# Patient Record
Sex: MALE | Race: WHITE | Employment: FULL TIME | URBAN - METROPOLITAN AREA
[De-identification: names, ages, dates, MRNs, and addresses within clinical notes are randomized per-mention and may not be internally consistent; named-entity substitution may affect disease eponyms.]

---

## 2018-05-17 ENCOUNTER — HOSPITAL ENCOUNTER (OUTPATIENT)
Dept: RADIATION ONCOLOGY | Age: 73
Discharge: HOME | End: 2018-05-17
Payer: COMMERCIAL

## 2018-05-17 VITALS
DIASTOLIC BLOOD PRESSURE: 81 MMHG | BODY MASS INDEX: 24.8 KG/M2 | TEMPERATURE: 97.6 F | SYSTOLIC BLOOD PRESSURE: 130 MMHG | OXYGEN SATURATION: 96 % | HEART RATE: 68 BPM | WEIGHT: 163.1 LBS | RESPIRATION RATE: 18 BRPM

## 2018-05-17 DIAGNOSIS — C61 PROSTATE CANCER (HCC): Primary | ICD-10-CM

## 2018-05-17 PROCEDURE — 99211 OFF/OP EST MAY X REQ PHY/QHP: CPT

## 2018-05-17 NOTE — PROGRESS NOTES
Pt here today for initial RT consult for prostate cancer with Dr. Teresita Wright. Pt was first diagnosed in 2005. His 12/4/17 PSA 6.3. Pt has an AUA score of 2/35 with minimal urinary c/o. Pt completed an Yris scan at [facility]--postivie prostate lesion and also right iliac node. An overview of RT was given. Pt would like to delay getting started with RT as his wife has Lyme disease and she is needing treatments in [place] and he is planning to travel frequently. Pt will have an MRI Pelvis (order in Epic) and be presented at both [place] and [facility]'s, and then return in 3 weeks for further treatment discussion. Dr. Teresita Wright spoke with pt about the possibility of him receiving Cyberknife at [facility].

## 2018-05-17 NOTE — CONSULTS
Patient: Coretta Aguila MRN: [id number]  SSN: [id number]    YOB: 1945  Age: 68 y.o. Sex: male      Other Providers:  Ashley Saeed MD, Lorena Ziegler MD, Ron Kurtz MD    CHIEF COMPLAINT: Prostate Cancer    DIAGNOSIS: Low risk prostate adenocarcinoma, GS 3+3=6 in 3 JANET Cores,  PSA 6.3 (Density 0.27). Unequivocal findings of multiple pelvic lymph nodes on MRI. PREVIOUS TREATMENT:  1) TRUS Biopsy 2005 and 2010. 2) JANET BIOPSY 2/27/18     HISTORY OF PRESENT ILLNESS:  Coretta Aguila is a 68 y.o. male who I am seeing at the request of Dr. Victorina Figueroa for prostate cancer. He was seen and evaluated for an elevated PSA on routine screening with original biopsy in 2005. He went to [facility] and choose to undergo a trial of observation. However, per the referring provider's notes, it appears Dr. Leland Akers actually recommended radical prostatectomy at the time.     The patient had a negative biopsy by Dr. Celsa Duong in follow-up in 2010. Shanae Topete had the MRI 3/6/17 which showed a 4 x 8 mm posterior left apical lesion that was PIRADS and additionally a 4 mm left mid lesion that was PIRADS 3. Dr. Victorina Figueroa recommended that he be sent over to regional urology for strong consideration of biopsy.  Additionally he had some borderline enlarged lymph nodes in the right pelvis. He did undergo fusion biopsy which revealed 3 different areas containing Mark 6 disease. In addition he had 16 x 7 mm right obturator node and an 11 x 8 mm right iliac noted. No bony metastases were apparent. He continues to do well from a voiding standpoint. He enjoys excellent health for his age and has the appearance of a much younger man. PSA History:    1/2015:  3.8  7/2015:  3.9  7/2016:  3.99  2/2017:  4.9  11/2017:  6.3    Biopsy Results:    2005:  Mark's 3+4=7 left apex   2010:  Repeat biopsy was negative. JANET BIOPSY:  3 cores of GS 3+3=6. 23 gm. There was PNI.       He was ultimately seen back in the urology office to discuss the results of the biopsy and management options. Both radiation and surgical options were discussed and he has been referred to me to further discuss radiation as an option for management of his prostate cancer. GENITOURINARY REVIEW OF SYSTEMS:  An EPIC 26 was completed pretreatment. His AUA score was 2. He is sexually potent. PAST MEDICAL HISTORY:    Past Medical History:   Diagnosis Date    Cancer [facility])     prostate    Elevated PSA     Malignant neoplasm of prostate ([address]     Personal history of prostate cancer        The patient denies history of collagen vascular diseases, pacemaker insertion, prior radiation or prior chemotherapy. PAST SURGICAL HISTORY:   Past Surgical History:   Procedure Laterality Date    HX CARPAL TUNNEL RELEASE      HX COLONOSCOPY      HX ORTHOPAEDIC      left bicep in arm       MEDICATIONS:   No current outpatient prescriptions on file. ALLERGIES:   No Known Allergies    SOCIAL HISTORY:   Social History     Social History    Marital status:      Spouse name: N/A    Number of children: N/A    Years of education: N/A     Occupational History    Not on file. Social History Main Topics    Smoking status: Never Smoker    Smokeless tobacco: Never Used    Alcohol use 5.0 oz/week     10 Cans of beer per week    Drug use: No    Sexual activity: Yes     Partners: Female     Other Topics Concern    Not on file     Social History Narrative       FAMILY HISTORY:   Family History   Problem Relation Age of Onset    Hypertension Other      gen fam hx       REVIEW OF SYSTEMS: Please see the completed review of systems sheet in the chart that I have reviewed today.       PHYSICAL EXAMINATION:   ECOG Performance status 0  VITAL SIGNS:   Visit Vitals    /81    Pulse 68    Temp 97.6 °F (36.4 °C)    Resp 18    Wt 74 kg (163 lb 1.6 oz)    SpO2 96%    BMI 24.8 kg/m2        GENERAL: The patient is well-developed, ambulatory, alert and in no acute distress. HEENT: Head is normocephalic, atraumatic. Pupils are equal, round and reactive to light and accommodation. Extraocular movement intact. Hearing is intact bilaterally to finger rub. Oral cavity reveals no lesions. Mucous membranes are moist. NECK: Neck is supple with no masses. CARDIOVASCULAR: Heart is regular rate and rhythm. There are no murmurs rubs or gallups. Radial pulses are 2+ RESPIRATORY: Lungs are clear to auscultation and percussion. There is normal respiratory effort. GASTROINTESTINAL: The abdomen is soft, non-tender, nondistended with no hepatospelnomagaly. Digital rectal examination: No evidence of internal or external hemorrhoids, clear rectal vault without palpable masses, smooth prostate gland with no nodularity. LYMPHATIC: There is no cervical, supraclavicular or axillary lymphadenopathy bilaterally. MUSCULOSKELETAL: Extremities reveal no cyanosis, clubbing or edema.  is 5+/5. NEURO:  Cranial nerves II-XII grossly intact. Muscular strength and sensation are intact throughout all four extremities. PATHOLOGY:    Please see HPI for details of TRUS Biopsy. LABORATORY: No results found for: NA, K, CL, CO2, AGAP, GLU, BUN, CREA, GFRAA, GFRNA, CA, MG, PHOS, ALB, TBIL, TP, ALB, GLOB, AGRAT, SGOT, ALT, GPT  No results found for: WBC, HGB, HCT, PLT, HGBEXT, HCTEXT, PLTEXT, HGBEXT, HCTEXT, PLTEXT    Please see HPI for PSA History. RADIOLOGY:    No results found. IMPRESSION:  Kennedy Garrett is a 68 y.o. male with technically low risk prostate cancer although some pelvic lymph nodes of unclear significance. The natural history of prostate cancer was reviewed with the patient. Prognostic features including stage, Mark score, age, race, and PSA were reviewed. Treatment options for prostate cancer including active surveillance, hormonal therapy, radiotherapy, and surgery were compared and contrasted with regard to outcome and quality of life.  I discussed the radiotherapy options including low-dose rate brachytherapy, high-dose-rate brachytherapy, and external beam radiation therapy. We also discussed the addition of hormone therapy. Side effects and complications of radiation therapy including fatigue, urinary obstructive symptoms, rectal irritation and bleeding, and decline of sexual function were among the complications highlighted. He asked many questions which were answered to his satisfaction. For low risk disease, there are several viable treatment options including active surveillance which includes delaying definitive therapy until which time the disease either progresses, or the patient is no longer interested in this option. At this time surgery with radical prostatectomy, LDR brachytherapy, or IMRT as monotherapy are recommended. I advised the patient each of these options offer similar tumor control with differences found mainly in varying toxicity profiles. I do not recommend hormone therapy low risk disease. For high risk disease, there are several viable treatment options but I generally do not advise active surveillance. Surgery with radical prostatectomy or IMRT examination with a protracted course of androgen deprivation therapy ( traditionally 2-3 years) are appropriate options. There is however data in support of use of 18 months of androgen deprivation therapy (compared to 36 months) based on a study published in abstract format at [facility] 2017 from a randomized phase III trial where cancer outcomes were similar but imrproved quality of life was noted in the shorter ADT group. I advised the patient each of these options offer similar tumor contol with differences found mainly in varying toxicity profiles.      IMRT is given as a single modality treatment or as initial treatment prior to an HDR boost.  A total of 45 Gy would be delivered to the pelvis and prostate gland followed by a 15 gray in 1 fraction HDR boost to the prostate gland alone. For patients who are not candidates for brachytherapy either by poor anatomy meaning substantial arch interference or a prostate gland outside of 20-60 cc where good dosimetry is often not achievable, IMRT is given for the entirety of the treatment course. Furthermore, for low or intermediate risk, there is now substantial data in support of hypo-fractionated radiation. Commonly a dose greater than 2 Gy per fraction, such as 2.5 Gy as given in numerous clinical trials including RTOG 0415, to a total dose of 70 Gy offers similar toxicity outcomes and is found to be non-inferior to standard fractionated radiation and therefore more logistically appealing. SBRT is given as single modality treatment for low risk prostate cancer. There is new and evolving data from the radiation oncology literature and support of what is complained as extreme hypo-fractionation. This generally includes treatment over 3-5 fractions to the prostate gland alone. Current studies have really evaluated only low risk disease with targeting of the prostate gland alone excluding the seminal vesicles and the lymph nodes. This is also generally offered for patients with low AUA scores. While its indications overlap substantially with LDR brachytherapy, it does not have the limitation of prostate gland size between 20 and 60 cc as even smaller glands are easily targetable with this technique. It also offers a substantial advantage of being given as an outpatient without any surgery required for any protracted hospital stay. While we are confident long-term data will be in support of this is a single modality treatment, its major limitation at this point is short follow-up on the studies that have been completed. Nonetheless, it is an excellent option for patients with low risk prostate cancer who are either not candidates for surgery or who wish to avoid surgery.      Unfortunately our dialogue was quite complex as his imaging findings from 2017 are not concordant with the biopsy findings with only having Mark 6 disease. For that reason I would like to repeat an MRI to better clarify the lymph nodes. These have changed, I would be concerned and potentially fill biopsy was warranted. If not, less aggressive options would be viable such as hyperfractionated or even extreme hyperfractionation with CyberKnife. After his imaging is completed I would like his case reviewed in conference performed again ultimately treatment decisions but do feel treatment is likely warranted based on the perineural invasion, MRI findings, and his PSA density. However, continued surveillance would not be unreasonable considering the long natural history of disease dating back to his original diagnosis in 2005. PLAN:    1) Discussed viable treatment options focusing on external beam radiation highlighting the risks, benefits, and side effects from treatment. 2) Reviewed available research treatment and cancer care protocols for which patient may be eligible. Unfortunately there are no matching clinical trials available at this time. 3) Further staging to include repeat MRI pelvis. 4) Patient will be scheduled for follow up after presentation in tumor board and subsequent staging scans before making a treatment decision.        Aron Cid MD   May 17, 2018

## 2018-06-18 ENCOUNTER — HOSPITAL ENCOUNTER (OUTPATIENT)
Dept: MRI IMAGING | Age: 73
Discharge: HOME | End: 2018-06-18
Attending: RADIOLOGY
Payer: MEDICARE

## 2018-06-18 DIAGNOSIS — C61 PROSTATE CANCER (HCC): ICD-10-CM

## 2018-06-18 LAB — CREAT BLD-MCNC: 0.7 MG/DL (ref 0.8–1.5)

## 2018-06-18 PROCEDURE — 74011000258 HC RX REV CODE- 258: Performed by: RADIOLOGY

## 2018-06-18 PROCEDURE — 82565 ASSAY OF CREATININE: CPT

## 2018-06-18 PROCEDURE — 72197 MRI PELVIS W/O & W/DYE: CPT

## 2018-06-18 PROCEDURE — A9575 INJ GADOTERATE MEGLUMI 0.1ML: HCPCS | Performed by: RADIOLOGY

## 2018-06-18 PROCEDURE — 74011250636 HC RX REV CODE- 250/636: Performed by: RADIOLOGY

## 2018-06-18 RX ORDER — SODIUM CHLORIDE 0.9 % (FLUSH) 0.9 %
10 SYRINGE (ML) INJECTION
Status: COMPLETED | OUTPATIENT
Start: 2018-06-18 | End: 2018-06-18

## 2018-06-18 RX ORDER — GADOTERATE MEGLUMINE 376.9 MG/ML
14 INJECTION INTRAVENOUS
Status: COMPLETED | OUTPATIENT
Start: 2018-06-18 | End: 2018-06-18

## 2018-06-18 RX ADMIN — Medication 10 ML: at 16:24

## 2018-06-18 RX ADMIN — SODIUM CHLORIDE 100 ML: 900 INJECTION, SOLUTION INTRAVENOUS at 16:24

## 2018-06-18 RX ADMIN — GADOTERATE MEGLUMINE 14 ML: 376.9 INJECTION INTRAVENOUS at 16:24

## 2018-06-28 ENCOUNTER — HOSPITAL ENCOUNTER (OUTPATIENT)
Dept: RADIATION ONCOLOGY | Age: 73
Discharge: HOME | End: 2018-06-28
Payer: MEDICARE

## 2018-06-28 VITALS
OXYGEN SATURATION: 100 % | SYSTOLIC BLOOD PRESSURE: 117 MMHG | BODY MASS INDEX: 24.37 KG/M2 | HEIGHT: 68 IN | RESPIRATION RATE: 16 BRPM | WEIGHT: 160.8 LBS | DIASTOLIC BLOOD PRESSURE: 76 MMHG | TEMPERATURE: 98.2 F | HEART RATE: 60 BPM

## 2018-06-28 PROCEDURE — 99211 OFF/OP EST MAY X REQ PHY/QHP: CPT

## 2018-06-28 NOTE — PROGRESS NOTES
F/u prostate cancer. S/p MDC. MRI pelvis 6-18-18. Pt will have Space Oar 8-22-18 per Felipa Patel. Cyberknife at [place]. Records emailed to [email or web address].     Frank Ramirez RN

## 2018-06-28 NOTE — PROGRESS NOTES
Patient: Franca Caro MRN: [id number]  SSN: [id number]    YOB: 1945  Age: 68 y.o. Sex: male      Other Providers:  Shauna Tucker MD, Michelle Farah MD, Margareth Reveles MD    CHIEF COMPLAINT: Prostate Cancer    DIAGNOSIS: Low risk prostate adenocarcinoma, GS 3+3=6 in 3 JANET Cores,  PSA 6.3 (Density 0.27). Unequivocal findings of multiple pelvic lymph nodes on MRI. PREVIOUS TREATMENT:  1) TRUS Biopsy 2005 and 2010. 2) JANET BIOPSY 2/27/18     HISTORY OF PRESENT ILLNESS:  Franca Caro is a 68 y.o. male who I am seeing at the request of Dr. Claudette Arbour for prostate cancer back in follow up after original consult 5/17/18. He was seen and evaluated for an elevated PSA on routine screening with original biopsy in 2005. He went to [facility] and choose to undergo a trial of observation. However, per the referring provider's notes, it appears Dr. Latrell Hager actually recommended radical prostatectomy at the time.     The patient had a negative biopsy by Dr. Henry Mart in follow-up in 2010. Bassam Alexis had the MRI 3/6/17 which showed a 4 x 8 mm posterior left apical lesion that was PIRADS and additionally a 4 mm left mid lesion that was PIRADS 3. Dr. Claudette Arbour recommended that he be sent over to regional urology for strong consideration of biopsy.  Additionally he had some borderline enlarged lymph nodes in the right pelvis. He did undergo fusion biopsy which revealed 3 different areas containing Mark 6 disease. In addition he had 16 x 7 mm right obturator node and an 11 x 8 mm right iliac noted. No bony metastases were apparent. He continues to do well from a voiding standpoint. He enjoys excellent health for his age and has the appearance of a much younger man. PSA History:    1/2015:  3.8  7/2015:  3.9  7/2016:  3.99  2/2017:  4.9  11/2017:  6.3    Biopsy Results:    2005:  Mark's 3+4=7 left apex   2010:  Repeat biopsy was negative. JANET BIOPSY:  3 cores of GS 3+3=6. 23 gm. There was PNI. He was ultimately seen back in the urology office to discuss the results of the biopsy and management options. Both radiation and surgical options were discussed and he has been referred to me to further discuss radiation as an option for management of his prostate cancer. Brittle lengthy discussion regarding his prior care in the accompanying factor of the involved lymph node. We felt it was likely benign, but we elected to proceed with an MRI to document any change in he returned to discuss further management options. GENITOURINARY REVIEW OF SYSTEMS:  An EPIC 26 was completed pretreatment. His AUA score was 2. He is sexually potent. PAST MEDICAL HISTORY:    Past Medical History:   Diagnosis Date    Cancer [facility])     prostate    Elevated PSA     Malignant neoplasm of prostate [address] [address]     Personal history of prostate cancer        The patient denies history of collagen vascular diseases, pacemaker insertion, prior radiation or prior chemotherapy. PAST SURGICAL HISTORY:   Past Surgical History:   Procedure Laterality Date    HX CARPAL TUNNEL RELEASE      HX COLONOSCOPY      HX ORTHOPAEDIC      left bicep in arm       MEDICATIONS:   No current outpatient prescriptions on file. ALLERGIES:   No Known Allergies    SOCIAL HISTORY:   Social History     Social History    Marital status:      Spouse name: N/A    Number of children: N/A    Years of education: N/A     Occupational History    Not on file.      Social History Main Topics    Smoking status: Never Smoker    Smokeless tobacco: Never Used    Alcohol use 5.0 oz/week     10 Cans of beer per week    Drug use: No    Sexual activity: Yes     Partners: Female     Other Topics Concern    Not on file     Social History Narrative       FAMILY HISTORY:   Family History   Problem Relation Age of Onset    Hypertension Other      gen fam hx       PHYSICAL EXAMINATION:   ECOG Performance status 0  VITAL SIGNS:   There were no vitals taken for this visit. GENERAL: The patient is well-developed, ambulatory, alert and in no acute distress. CARDIOVASCULAR: Heart is regular rate and rhythm. There are no murmurs rubs or gallups. Radial pulses are 2+ RESPIRATORY: Lungs are clear to auscultation and percussion. There is normal respiratory effort. GASTROINTESTINAL: The abdomen is soft, non-tender, nondistended with no hepatospelnomagaly. Digital rectal examination: COPIED FROM PRIOR - No evidence of internal or external hemorrhoids, clear rectal vault without palpable masses, smooth prostate gland with no nodularity. PATHOLOGY:    Please see HPI for details of TRUS Biopsy. LABORATORY: No results found for: NA, K, CL, CO2, AGAP, GLU, BUN, CREA, GFRAA, GFRNA, CA, MG, PHOS, ALB, TBIL, TP, ALB, GLOB, AGRAT, SGOT, ALT, GPT  No results found for: WBC, HGB, HCT, PLT, HGBEXT, HCTEXT, PLTEXT, HGBEXT, HCTEXT, PLTEXT    Please see HPI for PSA History. RADIOLOGY:    I have personally reviewed the imaging and agree with the reports below. Mri Pelv W Wo Cont    Result Date: 6/18/2018  MRI of the Prostate INDICATION:  Prostate Cancer Standard MRI sequences were obtained through the pelvis in multiple planes. Images were obtained before and after intravenous infusion of cc of Multihance. FINDINGS: Prostate size: 3.9 x 2.8 x 3.6 cm. Central gland:  Central gland is heterogeneous. No discrete mass is seen. Peripheral gland: There is a 3 mm focus of restricted diffusion in the left mid gland posteriorly. There is a second small 4 mm focus of restricted diffusion in the left apex. There is only minimal corresponding abnormal signal on the T2 sequences there is no evidence of extracapsular extension. Perineural:  Neurovascular bundles and posterolateral periprostatic fat are normal, no evidence of tumor extension. Seminal vesicles:  Normal. Lymph nodes: There is a 14 x 5 mm lymph node just posterior to the right external iliac vein.   Few small inguinal lymph nodes are also present. Bones:  No evidence of bony metastatic disease. Bladder/rectum:  Preserved and grossly unremarkable     IMPRESSION: 1. 2 small foci of restricted diffusion in the left mid gland and apex consistent with small areas of prostate tumor. No evidence for capsular extension. Pirads four. 2. Slightly prominent right pelvic sidewall lymph node, probably benign given low tumor burden in the prostate. IMPRESSION:  Osmin Enrique is a 68 y.o. male with technically low risk prostate cancer although some pelvic lymph nodes of unclear significance. The natural history of prostate cancer was again reviewed with the patient. Prognostic features including stage, [place] score, age, race, and PSA were reviewed. Treatment options for prostate cancer including active surveillance, hormonal therapy, radiotherapy, and surgery were compared and contrasted with regard to outcome and quality of life. I discussed the radiotherapy options including low-dose rate brachytherapy, high-dose-rate brachytherapy, and external beam radiation therapy. We also discussed the addition of hormone therapy. Side effects and complications of radiation therapy including fatigue, urinary obstructive symptoms, rectal irritation and bleeding, and decline of sexual function were among the complications highlighted. He asked many questions which were answered to his satisfaction. Unfortunately our dialogue was quite complex as his imaging findings from 2017 are not concordant with the biopsy findings with only having Mark 6 disease. for this reason we elected to proceed with a repeat air MRI to determine if the lymph nodes have changed. If not, less aggressive options would be viable such as hyperfractionated or even extreme hyperfractionation with CyberKnife or even active surveillance further.   His case was also discussed in conference and the  group agreed any of these options were reasonable and that the lymph node did not change and based on its characteristics, was felt to be benign. After our discussion today, he did feel he would like to proceed with treatment and felt CyberKnife would be his best option. He does have challenges with his wife having Lyme disease and requiring special treatment in [place]. As a result, he would like to delay his start date until mid August and we will accommodate. He was tentatively placed on the schedule for [facility] August 22. PLAN:    1) Discussed viable treatment options focusing on external beam radiation highlighting the risks, benefits, and side effects from treatment. 2) Reviewed available research treatment and cancer care protocols for which patient may be eligible. Unfortunately there are no matching clinical trials available at this time. 3) patient has been scheduled for hydrogel August 22, 2018. 4) Patient will be scheduled for CyberKnife radiosurgery following [facility]. Portions of this note were copied from prior encounters and reviewed for accuracy, currency, and represent documentation and tasks completed during this encounter. I verify and attest these portions to be unchanged from prior visits.     Armin Rothman MD  06/28/18

## 2020-02-05 RX ORDER — SODIUM CHLORIDE 0.9 % (FLUSH) 0.9 %
5-40 SYRINGE (ML) INJECTION EVERY 8 HOURS
Status: CANCELLED | OUTPATIENT
Start: 2020-02-05

## 2020-02-05 RX ORDER — SODIUM CHLORIDE 0.9 % (FLUSH) 0.9 %
5-40 SYRINGE (ML) INJECTION AS NEEDED
Status: CANCELLED | OUTPATIENT
Start: 2020-02-05

## 2020-02-06 ENCOUNTER — ANESTHESIA EVENT (OUTPATIENT)
Dept: SURGERY | Age: 75
End: 2020-02-06
Payer: MEDICARE

## 2020-02-07 ENCOUNTER — HOSPITAL ENCOUNTER (OUTPATIENT)
Age: 75
Setting detail: OUTPATIENT SURGERY
Discharge: HOME | End: 2020-02-07
Attending: ORTHOPAEDIC SURGERY | Admitting: ORTHOPAEDIC SURGERY
Payer: MEDICARE

## 2020-02-07 ENCOUNTER — ANESTHESIA (OUTPATIENT)
Dept: SURGERY | Age: 75
End: 2020-02-07
Payer: MEDICARE

## 2020-02-07 VITALS
SYSTOLIC BLOOD PRESSURE: 128 MMHG | OXYGEN SATURATION: 93 % | HEIGHT: 68 IN | HEART RATE: 68 BPM | WEIGHT: 165 LBS | RESPIRATION RATE: 16 BRPM | DIASTOLIC BLOOD PRESSURE: 78 MMHG | TEMPERATURE: 97.5 F | BODY MASS INDEX: 25.01 KG/M2

## 2020-02-07 PROCEDURE — 76210000063 HC OR PH I REC FIRST 0.5 HR: Performed by: ORTHOPAEDIC SURGERY

## 2020-02-07 PROCEDURE — 77030010509 HC AIRWY LMA MSK TELE -A: Performed by: NURSE ANESTHETIST, CERTIFIED REGISTERED

## 2020-02-07 PROCEDURE — 74011250636 HC RX REV CODE- 250/636: Performed by: ORTHOPAEDIC SURGERY

## 2020-02-07 PROCEDURE — 77030029203 HC IRR KT CYSTO/TUR BBMI -A: Performed by: ORTHOPAEDIC SURGERY

## 2020-02-07 PROCEDURE — 77030038012 HC WND COBLATN S&N -F: Performed by: ORTHOPAEDIC SURGERY

## 2020-02-07 PROCEDURE — 77030027385 HC BLD SHV ARTHSCP STRY -B: Performed by: ORTHOPAEDIC SURGERY

## 2020-02-07 PROCEDURE — 77030018836 HC SOL IRR NACL ICUM -A: Performed by: ORTHOPAEDIC SURGERY

## 2020-02-07 PROCEDURE — 77030006668 HC BLD SHV MENSCS STRY -B: Performed by: ORTHOPAEDIC SURGERY

## 2020-02-07 PROCEDURE — 76060000032 HC ANESTHESIA 0.5 TO 1 HR: Performed by: ORTHOPAEDIC SURGERY

## 2020-02-07 PROCEDURE — 77030000032 HC CUF TRNQT ZIMM -B: Performed by: ORTHOPAEDIC SURGERY

## 2020-02-07 PROCEDURE — 74011250636 HC RX REV CODE- 250/636: Performed by: REGISTERED NURSE

## 2020-02-07 PROCEDURE — 76210000020 HC REC RM PH II FIRST 0.5 HR: Performed by: ORTHOPAEDIC SURGERY

## 2020-02-07 PROCEDURE — 74011250637 HC RX REV CODE- 250/637: Performed by: ANESTHESIOLOGY

## 2020-02-07 PROCEDURE — 74011000250 HC RX REV CODE- 250: Performed by: REGISTERED NURSE

## 2020-02-07 PROCEDURE — 76010000138 HC OR TIME 0.5 TO 1 HR: Performed by: ORTHOPAEDIC SURGERY

## 2020-02-07 PROCEDURE — 74011250636 HC RX REV CODE- 250/636: Performed by: ANESTHESIOLOGY

## 2020-02-07 RX ORDER — MIDAZOLAM HYDROCHLORIDE 1 MG/ML
2 INJECTION, SOLUTION INTRAMUSCULAR; INTRAVENOUS
Status: DISCONTINUED | OUTPATIENT
Start: 2020-02-07 | End: 2020-02-07 | Stop reason: HOSPADM

## 2020-02-07 RX ORDER — OXYCODONE HYDROCHLORIDE 5 MG/1
5 TABLET ORAL
Status: DISCONTINUED | OUTPATIENT
Start: 2020-02-07 | End: 2020-02-07 | Stop reason: HOSPADM

## 2020-02-07 RX ORDER — HYDROMORPHONE HYDROCHLORIDE 2 MG/ML
0.5 INJECTION, SOLUTION INTRAMUSCULAR; INTRAVENOUS; SUBCUTANEOUS
Status: DISCONTINUED | OUTPATIENT
Start: 2020-02-07 | End: 2020-02-07 | Stop reason: HOSPADM

## 2020-02-07 RX ORDER — SODIUM CHLORIDE 0.9 % (FLUSH) 0.9 %
5-40 SYRINGE (ML) INJECTION EVERY 8 HOURS
Status: DISCONTINUED | OUTPATIENT
Start: 2020-02-07 | End: 2020-02-07 | Stop reason: HOSPADM

## 2020-02-07 RX ORDER — LIDOCAINE HYDROCHLORIDE 10 MG/ML
0.1 INJECTION INFILTRATION; PERINEURAL AS NEEDED
Status: DISCONTINUED | OUTPATIENT
Start: 2020-02-07 | End: 2020-02-07 | Stop reason: HOSPADM

## 2020-02-07 RX ORDER — ONDANSETRON 2 MG/ML
4 INJECTION INTRAMUSCULAR; INTRAVENOUS
Status: DISCONTINUED | OUTPATIENT
Start: 2020-02-07 | End: 2020-02-07 | Stop reason: HOSPADM

## 2020-02-07 RX ORDER — ALBUTEROL SULFATE 0.83 MG/ML
2.5 SOLUTION RESPIRATORY (INHALATION) AS NEEDED
Status: DISCONTINUED | OUTPATIENT
Start: 2020-02-07 | End: 2020-02-07 | Stop reason: HOSPADM

## 2020-02-07 RX ORDER — SODIUM CHLORIDE 0.9 % (FLUSH) 0.9 %
5-40 SYRINGE (ML) INJECTION AS NEEDED
Status: DISCONTINUED | OUTPATIENT
Start: 2020-02-07 | End: 2020-02-07 | Stop reason: HOSPADM

## 2020-02-07 RX ORDER — DEXAMETHASONE SODIUM PHOSPHATE 4 MG/ML
INJECTION, SOLUTION INTRA-ARTICULAR; INTRALESIONAL; INTRAMUSCULAR; INTRAVENOUS; SOFT TISSUE AS NEEDED
Status: DISCONTINUED | OUTPATIENT
Start: 2020-02-07 | End: 2020-02-07 | Stop reason: HOSPADM

## 2020-02-07 RX ORDER — LIDOCAINE HYDROCHLORIDE 20 MG/ML
INJECTION, SOLUTION EPIDURAL; INFILTRATION; INTRACAUDAL; PERINEURAL AS NEEDED
Status: DISCONTINUED | OUTPATIENT
Start: 2020-02-07 | End: 2020-02-07 | Stop reason: HOSPADM

## 2020-02-07 RX ORDER — SODIUM CHLORIDE, SODIUM LACTATE, POTASSIUM CHLORIDE, CALCIUM CHLORIDE 600; 310; 30; 20 MG/100ML; MG/100ML; MG/100ML; MG/100ML
1000 INJECTION, SOLUTION INTRAVENOUS CONTINUOUS
Status: DISCONTINUED | OUTPATIENT
Start: 2020-02-07 | End: 2020-02-07 | Stop reason: HOSPADM

## 2020-02-07 RX ORDER — PROPOFOL 10 MG/ML
INJECTION, EMULSION INTRAVENOUS AS NEEDED
Status: DISCONTINUED | OUTPATIENT
Start: 2020-02-07 | End: 2020-02-07 | Stop reason: HOSPADM

## 2020-02-07 RX ORDER — ONDANSETRON 2 MG/ML
INJECTION INTRAMUSCULAR; INTRAVENOUS AS NEEDED
Status: DISCONTINUED | OUTPATIENT
Start: 2020-02-07 | End: 2020-02-07 | Stop reason: HOSPADM

## 2020-02-07 RX ORDER — FENTANYL CITRATE 50 UG/ML
INJECTION, SOLUTION INTRAMUSCULAR; INTRAVENOUS AS NEEDED
Status: DISCONTINUED | OUTPATIENT
Start: 2020-02-07 | End: 2020-02-07 | Stop reason: HOSPADM

## 2020-02-07 RX ORDER — ROPIVACAINE HYDROCHLORIDE 5 MG/ML
INJECTION, SOLUTION EPIDURAL; INFILTRATION; PERINEURAL AS NEEDED
Status: DISCONTINUED | OUTPATIENT
Start: 2020-02-07 | End: 2020-02-07 | Stop reason: HOSPADM

## 2020-02-07 RX ORDER — ACETAMINOPHEN 500 MG
1000 TABLET ORAL ONCE
Status: COMPLETED | OUTPATIENT
Start: 2020-02-07 | End: 2020-02-07

## 2020-02-07 RX ADMIN — LIDOCAINE HYDROCHLORIDE 100 MG: 20 INJECTION, SOLUTION EPIDURAL; INFILTRATION; INTRACAUDAL; PERINEURAL at 10:05

## 2020-02-07 RX ADMIN — ONDANSETRON 4 MG: 2 INJECTION INTRAMUSCULAR; INTRAVENOUS at 10:31

## 2020-02-07 RX ADMIN — FENTANYL CITRATE 50 MCG: 50 INJECTION INTRAMUSCULAR; INTRAVENOUS at 10:05

## 2020-02-07 RX ADMIN — DEXAMETHASONE SODIUM PHOSPHATE 4 MG: 4 INJECTION, SOLUTION INTRAMUSCULAR; INTRAVENOUS at 10:31

## 2020-02-07 RX ADMIN — PROPOFOL 200 MG: 10 INJECTION, EMULSION INTRAVENOUS at 10:05

## 2020-02-07 RX ADMIN — SODIUM CHLORIDE, SODIUM LACTATE, POTASSIUM CHLORIDE, AND CALCIUM CHLORIDE 1000 ML: 600; 310; 30; 20 INJECTION, SOLUTION INTRAVENOUS at 09:00

## 2020-02-07 RX ADMIN — PHENYLEPHRINE HYDROCHLORIDE 100 MCG: 10 INJECTION INTRAVENOUS at 10:13

## 2020-02-07 RX ADMIN — ACETAMINOPHEN 1000 MG: 500 TABLET, FILM COATED ORAL at 09:15

## 2020-02-07 RX ADMIN — PHENYLEPHRINE HYDROCHLORIDE 100 MCG: 10 INJECTION INTRAVENOUS at 10:14

## 2020-02-07 RX ADMIN — FENTANYL CITRATE 25 MCG: 50 INJECTION INTRAMUSCULAR; INTRAVENOUS at 10:22

## 2020-02-07 NOTE — OP NOTES
[facility]  OPERATIVE REPORT    Name:  Kelly Wright  MR#:  [id number]  :  1945  ACCOUNT #:  [de-identified]  DATE OF SERVICE:  2020    PREOPERATIVE DIAGNOSIS:  Possible quadriceps tendon of the left knee. POSTOPERATIVE DIAGNOSES:  1. Partial tear of the left quadriceps tendon. 2.  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 3.  Tears of the medial and lateral meniscus. 4.  Mild chondrocalcinosis. PROCEDURE PERFORMED:  1. Abrasion arthroplasty of the patellofemoral joint, [id number].  2.  Medial and lateral meniscectomy, 42191.  3.  Debridement of partial tear of the quadriceps tendon. [id number]    SURGEON:  Yolanda Farah MD    ASSISTANT:  None. ANESTHESIA:  General.    COMPLICATIONS:  None. SPECIMENS REMOVED:  None. IMPLANTS:  None. ESTIMATED BLOOD LOSS:  Minimal.    PROCEDURE:  After an adequate level of general anesthesia was obtained, the left leg was prepped and draped in usual sterile fashion. The patient had acute onset of pain involving his knee, and with the inability to extend his knee in the office, it was felt that he had a small palpable defect. He was unable to extend his knee for several days after his injury. In the preop area, he had better extension but still pain along the lateral patellar area. The leg was prepped and draped in usual sterile fashion. Tourniquet was used for the procedure. Photos were made for the patient. Anteromedial and anterolateral portals were made. Findings revealed normal tracking of the patella. I inspected the suprapatellar area and there was some hemorrhage on the anterolateral aspect of his patella with an osteophyte on the patella which was removed with the shaver and the ye, but he did not have a lot of hemorrhage present. The patient had degenerative changes at the patellofemoral joint. An abrasion arthroplasty was performed. The patella was not subluxating. The ACL and the PCL were intact.   In the medial compartment, there was a tear of the posterior one-third of the medial meniscus resected with the basket and the shaver, but crystals consistent with probable mild chondrocalcinosis. In the lateral compartment, there was a large flap tear of the anterior third of the lateral meniscus and the tear was resected with the baskets and the shaver. The articular surface laterally looked good. There was just minimal chondromalacia in the medial compartment. The ACL and PCL were intact. The tears were resected with the basket and the shaver and was left with a stable rim. The knee was then copiously irrigated. Sterile dressings were applied.       MD STEPHANIE [id number]  D:  02/07/2020 10:35  T:  02/07/2020 10:59  JOB #:  [id number]  CC:  [address] [place] [address]

## 2020-02-07 NOTE — DISCHARGE INSTRUCTIONS
POST OPERATIVE INSTRUCTIONS FOR KNEE ARTHROSCOPY    1. Unless you receive other instructions, you may weight bear as tolerated      2. Apply ice to your knee for 20-30 minutes several times per day for the first 3 days and then as needed. Icing will decrease the amount of inflammation and is helpful after exercise    3. You may remove the dressings the following day and apply waterproof band aids. Some bleeding and drainage may persist for several days following  surgery. Waterproof band aids may be used while showering and avoid tub baths for two weeks. 4. You will be given pain medications and do not drive under the influence. Some patients take pain medication at night and antiinflammatory medication during day  when pain decreases. 5. You may be given Phenergan for nausea    6. You will receive a phone call from our office notifying you of your follow up visit    7. If any problems call [phone]    ·     DIET  · Clear liquids until no nausea or vomiting; then light diet for the first day. · Advance to regular diet on second day, unless your doctor orders otherwise. · If nausea and vomiting continues, call your doctor. · Avoid greasy and spicy food today to reduce nausea. PAIN  · Take pain medication as directed by your doctor. · Call your doctor if pain is NOT relieved by medication. · DO NOT take aspirin of blood thinners unless directed by your doctor. · Take pain pills with food to reduce nausea  · Pain pills may cause constipation. May use stool softener          CALL YOUR DOCTOR IF   · Excessive bleeding that does not stop after holding pressure over the area  · Temperature of 101 degrees F or above  · Excessive redness, swelling or bruising, and/ or green or yellow, smelly discharge from incision    AFTER ANESTHESIA   · For the first 24 hours: DO NOT Drive, Drink alcoholic beverages, or Make important decisions.    · Be aware of dizziness following anesthesia and while taking pain medication. APPOINTMENT DATE/ TIME: Keep scheduled appointment    YOUR DOCTOR'S PHONE NUMBER [phone]      DISCHARGE SUMMARY from Nurse    PATIENT INSTRUCTIONS:    After general anesthesia or intravenous sedation, for 24 hours or while taking prescription Narcotics:  · Limit your activities  · Do not drive and operate hazardous machinery  · Do not make important personal or business decisions  · Do  not drink alcoholic beverages  · If you have not urinated within 8 hours after discharge, please contact your surgeon on call. *  Please give a list of your current medications to your Primary Care Provider. *  Please update this list whenever your medications are discontinued, doses are      changed, or new medications (including over-the-counter products) are added. *  Please carry medication information at all times in case of emergency situations. These are general instructions for a healthy lifestyle:    No smoking/ No tobacco products/ Avoid exposure to second hand smoke    Surgeon General's Warning:  Quitting smoking now greatly reduces serious risk to your health. Obesity, smoking, and sedentary lifestyle greatly increases your risk for illness    A healthy diet, regular physical exercise & weight monitoring are important for maintaining a healthy lifestyle    You may be retaining fluid if you have a history of heart failure or if you experience any of the following symptoms:  Weight gain of 3 pounds or more overnight or 5 pounds in a week, increased swelling in our hands or feet or shortness of breath while lying flat in bed. Please call your doctor as soon as you notice any of these symptoms; do not wait until your next office visit.     Recognize signs and symptoms of STROKE:    F-face looks uneven    A-arms unable to move or move unevenly    S-speech slurred or non-existent    T-time-call 911 as soon as signs and symptoms begin-DO NOT go       Back to bed or wait to see if you get better-TIME IS BRAIN.

## 2020-02-07 NOTE — ANESTHESIA POSTPROCEDURE EVALUATION
Procedure(s):  KNEE ARTHROSCOPY WITH MEDIAL AND LATERAL MENISCECTOMY/ CHONDROPLASTY LEFT. general    Anesthesia Post Evaluation      Multimodal analgesia: multimodal analgesia used between 6 hours prior to anesthesia start to PACU discharge  Patient location during evaluation: bedside  Patient participation: complete - patient participated  Level of consciousness: awake and responsive to light touch  Pain management: adequate  Airway patency: patent  Anesthetic complications: no  Cardiovascular status: acceptable, hemodynamically stable, blood pressure returned to baseline and stable  Respiratory status: acceptable, unassisted, spontaneous ventilation and nonlabored ventilation  Hydration status: acceptable  Post anesthesia nausea and vomiting:  controlled      Vitals Value Taken Time   /67 2/7/2020 10:57 AM   Temp 36.4 °C (97.5 °F) 2/7/2020 10:42 AM   Pulse 67 2/7/2020 11:00 AM   Resp 16 2/7/2020 10:57 AM   SpO2 93 % 2/7/2020 11:00 AM   Vitals shown include unvalidated device data.

## 2020-02-07 NOTE — H&P
Outpatient Surgery History and Physical:  Siddhartha Love was seen and examined. CHIEF COMPLAINT:    LT knee . PE:   There were no vitals taken for this visit. Heart:   Regular rhythm      Lungs:  Are clear      Past Medical History:    Patient Active Problem List    Diagnosis    Malignant neoplasm of prostate [facility])       Surgical History:   Past Surgical History:   Procedure Laterality Date    HX CARPAL TUNNEL RELEASE      HX COLONOSCOPY      HX ORTHOPAEDIC Left     bicep repair    HX UROLOGICAL  08/2018    [employer]ER [employer] PLACEMENT        Social History: Patient  reports that he has never smoked. He has never used smokeless tobacco. He reports current alcohol use of about 10.0 standard drinks of alcohol per week. He reports that he does not use drugs. Family History:   Family History   Adopted: Yes   Family history unknown: Yes       Allergies: Reviewed per EMR  No Known Allergies    Medications:    No current facility-administered medications on file prior to encounter. No current outpatient medications on file prior to encounter. The surgery is planned for the  LT knee. History and physical has been reviewed. The patient has been examined. There have been no significant clinical changes since the completion of the originally dated History and Physical.  Patient identified by surgeon; surgical site was confirmed by patient and surgeon. The patient is here today for outpatient surgery. I have examined the patient, no changes are noted in the patient's medical status. Necessity for the procedure/care is still present and the history and physical above is current. See the office notes for the full long term history of the problem. Please see the recent office notes for the musculoskeletal examination.     Signed By: Ashlyn Herr MD     February 7, 2020 7:12 AM

## 2020-02-07 NOTE — ANESTHESIA PREPROCEDURE EVALUATION
Relevant Problems   No relevant active problems       Anesthetic History   No history of anesthetic complications            Review of Systems / Medical History  Patient summary reviewed and pertinent labs reviewed    Pulmonary  Within defined limits                 Neuro/Psych   Within defined limits           Cardiovascular                  Exercise tolerance: >4 METS     GI/Hepatic/Renal  Within defined limits              Endo/Other  Within defined limits           Other Findings              Physical Exam    Airway  Mallampati: II  TM Distance: 4 - 6 cm  Neck ROM: normal range of motion   Mouth opening: Normal     Cardiovascular    Rhythm: regular  Rate: normal      Pertinent negatives: No murmur   Dental  No notable dental hx       Pulmonary  Breath sounds clear to auscultation               Abdominal         Other Findings            Anesthetic Plan    ASA: 1  Anesthesia type: general          Induction: Intravenous  Anesthetic plan and risks discussed with: Patient      LMA

## 2020-08-05 NOTE — BRIEF OP NOTE
August 5, 2020        To: [facility]    Re: Goran Chavez (Date of birth 1955)    Wound Care Order:    · Remove old dressings.  · Cleanse right and left leg wounds with normal saline and gauze. Pat dry.  · Apply zinc barrier cream to sandrine-wounds.  · Cut slits into Hydrofera Blue Ready (to allow drainage to get through plastic backing), then apply to wounds.  · Cover Hydrofera Blue with ABD pads.  · Secure ABD pads with roll gauze if necessary.  · Apply Unna Boots to left and right lower legs. Ensure that top of Unna Boots are no more than two inches below the bend of the knee.    · Home Health to see patient twice per week for dressing changes.            ______________________________________  Sanju ANGULO   BRIEF OPERATIVE NOTE    Date of Procedure: 2/7/2020   Preoperative Diagnosis: Quadriceps muscle strain, left, initial encounter [S76.112A]  Postoperative Diagnosis: LEFT KNEE MEDIAL AND LATERAL MENISCUS TEARS/ CHONDROMALACIA    Procedure(s):  KNEE ARTHROSCOPY WITH MEDIAL AND LATERAL MENISCECTOMY/ CHONDROPLASTY LEFT  Surgeon(s) and Role:      Fariba De Dios MD - Primary         Surgical Assistant:         Surgical Staff:  Circ-1: Blane Arana RN  Circ-Relief: Reji OSMAN  Scrub Tech-1: Remus Ahumada  Scrub Tech-2: Kennedy Bernal  Event Time In Time Out   Incision Start 1016    Incision Close 1031      Anesthesia: General   Estimated Blood Loss:     Specimens: * No specimens in log *   Findings:      Complications:     Implants: * No implants in log *

## 2021-11-16 ENCOUNTER — ANESTHESIA EVENT (OUTPATIENT)
Dept: SURGERY | Age: 76
End: 2021-11-16
Payer: MEDICARE

## 2021-11-17 ENCOUNTER — ANESTHESIA (OUTPATIENT)
Dept: SURGERY | Age: 76
End: 2021-11-17
Payer: MEDICARE

## 2021-11-17 ENCOUNTER — HOSPITAL ENCOUNTER (OUTPATIENT)
Age: 76
Setting detail: OUTPATIENT SURGERY
Discharge: HOME | End: 2021-11-17
Attending: UROLOGY | Admitting: UROLOGY
Payer: MEDICARE

## 2021-11-17 VITALS
OXYGEN SATURATION: 95 % | BODY MASS INDEX: 26.23 KG/M2 | DIASTOLIC BLOOD PRESSURE: 69 MMHG | WEIGHT: 170 LBS | SYSTOLIC BLOOD PRESSURE: 112 MMHG | RESPIRATION RATE: 16 BRPM | TEMPERATURE: 97.5 F | HEART RATE: 68 BPM

## 2021-11-17 DIAGNOSIS — C61 MALIGNANT NEOPLASM OF PROSTATE (HCC): ICD-10-CM

## 2021-11-17 PROCEDURE — 76210000006 HC OR PH I REC 0.5 TO 1 HR: Performed by: UROLOGY

## 2021-11-17 PROCEDURE — 76942 ECHO GUIDE FOR BIOPSY: CPT | Performed by: UROLOGY

## 2021-11-17 PROCEDURE — 74011250636 HC RX REV CODE- 250/636: Performed by: ANESTHESIOLOGY

## 2021-11-17 PROCEDURE — 74011250636 HC RX REV CODE- 250/636: Performed by: UROLOGY

## 2021-11-17 PROCEDURE — 74011000250 HC RX REV CODE- 250: Performed by: NURSE ANESTHETIST, CERTIFIED REGISTERED

## 2021-11-17 PROCEDURE — 74011250637 HC RX REV CODE- 250/637: Performed by: ANESTHESIOLOGY

## 2021-11-17 PROCEDURE — 55700 PR BIOPSY OF PROSTATE,NEEDLE/PUNCH: CPT | Performed by: UROLOGY

## 2021-11-17 PROCEDURE — 77030003481 HC NDL BIOP GUN BARD -B: Performed by: UROLOGY

## 2021-11-17 PROCEDURE — 2709999900 HC NON-CHARGEABLE SUPPLY: Performed by: UROLOGY

## 2021-11-17 PROCEDURE — 76210000020 HC REC RM PH II FIRST 0.5 HR: Performed by: UROLOGY

## 2021-11-17 PROCEDURE — 88305 TISSUE EXAM BY PATHOLOGIST: CPT

## 2021-11-17 PROCEDURE — 76060000032 HC ANESTHESIA 0.5 TO 1 HR: Performed by: UROLOGY

## 2021-11-17 PROCEDURE — 76010000138 HC OR TIME 0.5 TO 1 HR: Performed by: UROLOGY

## 2021-11-17 PROCEDURE — 74011250636 HC RX REV CODE- 250/636: Performed by: NURSE ANESTHETIST, CERTIFIED REGISTERED

## 2021-11-17 RX ORDER — PROPOFOL 10 MG/ML
INJECTION, EMULSION INTRAVENOUS
Status: DISCONTINUED | OUTPATIENT
Start: 2021-11-17 | End: 2021-11-17 | Stop reason: HOSPADM

## 2021-11-17 RX ORDER — DIPHENHYDRAMINE HYDROCHLORIDE 50 MG/ML
12.5 INJECTION, SOLUTION INTRAMUSCULAR; INTRAVENOUS
Status: DISCONTINUED | OUTPATIENT
Start: 2021-11-17 | End: 2021-11-17 | Stop reason: HOSPADM

## 2021-11-17 RX ORDER — MIDAZOLAM HYDROCHLORIDE 1 MG/ML
2 INJECTION, SOLUTION INTRAMUSCULAR; INTRAVENOUS
Status: DISCONTINUED | OUTPATIENT
Start: 2021-11-17 | End: 2021-11-17 | Stop reason: HOSPADM

## 2021-11-17 RX ORDER — ACETAMINOPHEN 500 MG
1000 TABLET ORAL ONCE
Status: COMPLETED | OUTPATIENT
Start: 2021-11-17 | End: 2021-11-17

## 2021-11-17 RX ORDER — CEFAZOLIN SODIUM/WATER 2 G/20 ML
2 SYRINGE (ML) INTRAVENOUS
Status: COMPLETED | OUTPATIENT
Start: 2021-11-17 | End: 2021-11-17

## 2021-11-17 RX ORDER — LIDOCAINE HYDROCHLORIDE 20 MG/ML
INJECTION, SOLUTION EPIDURAL; INFILTRATION; INTRACAUDAL; PERINEURAL AS NEEDED
Status: DISCONTINUED | OUTPATIENT
Start: 2021-11-17 | End: 2021-11-17 | Stop reason: HOSPADM

## 2021-11-17 RX ORDER — HYDROMORPHONE HYDROCHLORIDE 2 MG/ML
0.5 INJECTION, SOLUTION INTRAMUSCULAR; INTRAVENOUS; SUBCUTANEOUS
Status: DISCONTINUED | OUTPATIENT
Start: 2021-11-17 | End: 2021-11-17 | Stop reason: HOSPADM

## 2021-11-17 RX ORDER — PROPOFOL 10 MG/ML
INJECTION, EMULSION INTRAVENOUS AS NEEDED
Status: DISCONTINUED | OUTPATIENT
Start: 2021-11-17 | End: 2021-11-17 | Stop reason: HOSPADM

## 2021-11-17 RX ORDER — HALOPERIDOL 5 MG/ML
1 INJECTION INTRAMUSCULAR
Status: DISCONTINUED | OUTPATIENT
Start: 2021-11-17 | End: 2021-11-17 | Stop reason: HOSPADM

## 2021-11-17 RX ORDER — FLUMAZENIL 0.1 MG/ML
0.2 INJECTION INTRAVENOUS
Status: DISCONTINUED | OUTPATIENT
Start: 2021-11-17 | End: 2021-11-17 | Stop reason: HOSPADM

## 2021-11-17 RX ORDER — OXYCODONE HYDROCHLORIDE 5 MG/1
5 TABLET ORAL
Status: DISCONTINUED | OUTPATIENT
Start: 2021-11-17 | End: 2021-11-17 | Stop reason: HOSPADM

## 2021-11-17 RX ORDER — LIDOCAINE HYDROCHLORIDE 10 MG/ML
0.1 INJECTION INFILTRATION; PERINEURAL AS NEEDED
Status: DISCONTINUED | OUTPATIENT
Start: 2021-11-17 | End: 2021-11-17 | Stop reason: HOSPADM

## 2021-11-17 RX ORDER — SODIUM CHLORIDE, SODIUM LACTATE, POTASSIUM CHLORIDE, CALCIUM CHLORIDE 600; 310; 30; 20 MG/100ML; MG/100ML; MG/100ML; MG/100ML
75 INJECTION, SOLUTION INTRAVENOUS CONTINUOUS
Status: DISCONTINUED | OUTPATIENT
Start: 2021-11-17 | End: 2021-11-17 | Stop reason: HOSPADM

## 2021-11-17 RX ORDER — SODIUM CHLORIDE, SODIUM LACTATE, POTASSIUM CHLORIDE, CALCIUM CHLORIDE 600; 310; 30; 20 MG/100ML; MG/100ML; MG/100ML; MG/100ML
100 INJECTION, SOLUTION INTRAVENOUS CONTINUOUS
Status: DISCONTINUED | OUTPATIENT
Start: 2021-11-17 | End: 2021-11-17 | Stop reason: HOSPADM

## 2021-11-17 RX ORDER — NALOXONE HYDROCHLORIDE 0.4 MG/ML
0.1 INJECTION, SOLUTION INTRAMUSCULAR; INTRAVENOUS; SUBCUTANEOUS
Status: DISCONTINUED | OUTPATIENT
Start: 2021-11-17 | End: 2021-11-17 | Stop reason: HOSPADM

## 2021-11-17 RX ADMIN — SODIUM CHLORIDE, SODIUM LACTATE, POTASSIUM CHLORIDE, AND CALCIUM CHLORIDE 100 ML/HR: 600; 310; 30; 20 INJECTION, SOLUTION INTRAVENOUS at 12:00

## 2021-11-17 RX ADMIN — PROPOFOL 20 MG: 10 INJECTION, EMULSION INTRAVENOUS at 14:20

## 2021-11-17 RX ADMIN — ACETAMINOPHEN 1000 MG: 500 TABLET ORAL at 13:39

## 2021-11-17 RX ADMIN — PROPOFOL 50 MG: 10 INJECTION, EMULSION INTRAVENOUS at 14:18

## 2021-11-17 RX ADMIN — LIDOCAINE HYDROCHLORIDE 40 MG: 20 INJECTION, SOLUTION EPIDURAL; INFILTRATION; INTRACAUDAL; PERINEURAL at 14:18

## 2021-11-17 RX ADMIN — CEFAZOLIN 2 G: 1 INJECTION, POWDER, FOR SOLUTION INTRAVENOUS at 14:18

## 2021-11-17 RX ADMIN — PROPOFOL 30 MG: 10 INJECTION, EMULSION INTRAVENOUS at 14:19

## 2021-11-17 RX ADMIN — PROPOFOL 140 MCG/KG/MIN: 10 INJECTION, EMULSION INTRAVENOUS at 14:18

## 2021-11-17 NOTE — H&P
History and Physical    Patient: Charline Franklin  MRN: [id number]  SSN: [id number]   YOB: 1945  Age: 68 y.o. 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  Surgery or definitive therapy was recommended. [facility] went to [address] undergo a trial of observation. I believe that Dr. Dm Ewing actually recommended radical prostatectomy at the time. The patient had a negative biopsy by Dr. Solorio in follow-up in 2010.  His PSAs been relatively stable but in February of 2017 it went up to 4.9. By November 2017 it was up to 6. 3.  Based on this I recommended an MRI and possible Yris biopsy. [facility] had the MRI which showed a 4 x 8 mm posterior left apical lesion that was PIRADS 4 hhe additionally had a 4 mm left mid lesion that was PIRADS 3. I recommended that he be sent over to regional urology at that time for strong consideration of biopsy.  Additionally had some borderline enlarged lymph nodes in the right pelvis.   He did undergo fusion biopsy in 2018 which revealed 3 different areas containing Mark 6 disease.  In addition he had 16 x 7 mm right obturator node and an 11 x 8 mm right iliac noted.  No bony metastases were apparent. At that point he elected to move forward with definitive therapy and underwent CyberKnife which he completed in October 2018. Unfortunately he has not had a good PSA response and his PSA was noted to be 4.42 in August 2021. It was rechecked in September 2021 and had increased up to 5.37. He had a repeat MRI in August 2021 that showed a 10 mm lesion in the right seminal vesicle as well as a 17 x 5 mm area at the right base of the prostate that were suspicious for malignancy. In September 2021 Kristy Men PET CT showed a hypermetabolic lesion in the right seminal vesicle as well as increased activity within the right base of the prostate. Bone scan was negative.   None of the study shows any definite adenopathy or disease outside the pelvis.   The patient continues to feel well and has essentially no voiding symptoms.          Past Medical History:   Diagnosis Date         Past Medical History:   Diagnosis Date    Adult acne     Elevated PSA     Malignant neoplasm of prostate ([facility] [address] 08/2018    radiation therapy      Past Surgical History:   Procedure Laterality Date    HX CARPAL TUNNEL RELEASE      HX COLONOSCOPY      HX KNEE ARTHROSCOPY Left 02/07/2020    HX ORTHOPAEDIC Left     bicep repair    HX UROLOGICAL  08/2018    FIDU[employer] MARKER AND PLACEMENT      No Known Allergies  Current Facility-Administered Medications   Medication Dose Route Frequency Provider Last Rate Last Admin    ceFAZolin (ANCEF) 2 g/20 mL in sterile water IV syringe  2 g IntraVENous ON CALL TO OR Radha Jurado MD   Held at 11/17/21 1340    lidocaine (XYLOCAINE) 10 mg/mL (1 %) injection 0.1 mL  0.1 mL SubCUTAneous PRN Becca June MD        lactated Ringers infusion  100 mL/hr IntraVENous CONTINUOUS Becca June  mL/hr at 11/17/21 1200 100 mL/hr at 11/17/21 1200    midazolam (VERSED) injection 2 mg  2 mg IntraVENous ONCE PRN Becca June MD             Physical Examination    Visit Vitals  BP (!) 163/77 (BP 1 Location: Left arm, BP Patient Position: Supine)   Pulse 80   Temp 98.8 °F (37.1 °C)   Resp 18   Wt 170 lb (77.1 kg)   SpO2 99%   BMI 26.23 kg/m²     Gen: Well developed, well nourished 68 y.o. male in no acute distress  Head: normocephalic, atraumatic  Mouth: Clear, no overt lesions, oral mucosa pink and moist  Neck: supple, no masses, no adenopathy or carotid bruits, trachea midline  Resp: clear to auscultation bilaterally, no wheeze, rhonchi or rales, excursions normal and symmetrical  Cardio: Regular rate and rhythm, no murmurs, clicks, gallops or rubs, no edema or varicosities  Abdomen: soft, nontender, nondistended, normoactive bowel sounds, no hernias, no hepatosplenomegaly   Extremeties: warm, well-perfused, no tenderness or swelling, normal gait/station  Neuro: sensation and strength grossly intact and symmetrical  Psych: alert and oriented to person, place and time      Impression: Prostate cancer. Plan: TRUS biopsy under anesthesia.

## 2021-11-17 NOTE — BRIEF OP NOTE
Brief Postoperative Note    Patient: Radha Loja  YOB: 1945  MRN: [id number]    Date of Procedure: 11/17/2021     Pre-Op Diagnosis: Prostate cancer with Elevated PSA [R97.20]    Post-Op Diagnosis: Same      Procedure(s):  TRUSS PROSTATE BIOPSY    Surgeon(s):  Tonya Mcgowan MD    Surgical Assistant: None    Anesthesia: MAC     Estimated Blood Loss (mL): Minimal    Complications: None    Specimens:   ID Type Source Tests Collected by Time Destination   1 : Right Seminal Vescicle Preservative   Tonya Mcgowan MD 11/17/2021 1430 Pathology   2 : Left Seminal Vescicle Preservative   Tonya Mcgowan MD 11/17/2021 1430 Pathology   3 : RA Preservative Prostate  Tonya Mcgowan MD 11/17/2021 1434 Pathology   4 : RM Preservative Prostate  Toyna Mcgowan MD 11/17/2021 1435 Pathology   5 : RB Preservative Prostate  Tonya Mcgowan MD 11/17/2021 1435 Pathology   6 : LA Preservative Prostate  Tonya Mcgowan MD 11/17/2021 1435 Pathology   7 : LM Preservative Prostate  Tonya Mcgowan MD 11/17/2021 1436 Pathology   8 : LB Preservative Prostate  Tonya Mcgowan MD 11/17/2021 1436 Pathology        Drains: None    Electronically Signed by Nakia Cho MD on 11/17/2021 at 2:54 PM

## 2021-11-17 NOTE — ANESTHESIA PREPROCEDURE EVALUATION
Anesthetic History   No history of anesthetic complications            Review of Systems / Medical History  Patient summary reviewed and pertinent labs reviewed    Pulmonary  Within defined limits                 Neuro/Psych   Within defined limits           Cardiovascular  Within defined limits                Exercise tolerance: >4 METS     GI/Hepatic/Renal  Within defined limits              Endo/Other        Cancer (Prostate)     Other Findings              Physical Exam    Airway  Mallampati: II  TM Distance: 4 - 6 cm  Neck ROM: normal range of motion   Mouth opening: Normal     Cardiovascular  Regular rate and rhythm,  S1 and S2 normal,  no murmur, click, rub, or gallop             Dental  No notable dental hx       Pulmonary  Breath sounds clear to auscultation               Abdominal  GI exam deferred       Other Findings            Anesthetic Plan    ASA: 1  Anesthesia type: total IV anesthesia          Induction: Intravenous  Anesthetic plan and risks discussed with: Patient and Spouse

## 2021-11-17 NOTE — DISCHARGE INSTRUCTIONS
Patient Education        Prostate Biopsy: About This Test  What is it? A prostate biopsy is a type of test. Your doctor takes about 10 to 12 small tissue samples from your prostate. Then another doctor looks at the tissue under a microscope to see if there are cancer cells. Why is this test done? You may need a prostate biopsy if your doctor found something of concern in your lab work or during your exam. A biopsy can help find out if you have prostate cancer. It may also be done for other reasons, such as monitoring the growth of prostate cancer for men on active surveillance. How do you prepare for the test?  Before you have a prostate biopsy, tell your doctor if you are taking any medicines or using any herbal supplements, or if you are allergic to any medicines. And tell your doctor if you have had bleeding problems, or you take aspirin or some other blood thinner. How is the test done? Some people have a prostate imaging test, such as an MRI or a CT scan, before their biopsy. The test results are used during the biopsy to select the areas of the prostate to sample. Before your biopsy, you may be given antibiotics to prevent infection. You may be asked to take off all of your clothes and put on a hospital gown. You may be given a sedative through a vein (IV) in your arm. The sedative will help you relax and stay still. If you have a general anesthetic, you will be in a recovery room for a few hours after the biopsy. Through the rectum  · You may be asked to kneel, lie on your side, or lie on your back. · Your doctor may inject an anesthetic around and into the prostate to numb the area before samples are taken. · An ultrasound probe will be gently inserted into your rectum. · A thin tool with a spring-loaded needle will be inserted next to the ultrasound probe. The ultrasound helps to locate the areas on the prostate where the samples will be taken.  If you had an MRI or CT scan, the test results will also be used to guide the sampling. · The needle enters the prostate and removes a sample. About 10 to 12 samples are usually taken. Through the perineum  · You will lie on an exam table either on your side or on your back with your knees bent. You will get anesthesia. The anesthesia may make you sleep. Or it may just numb the area being worked on.  · Your doctor will make a small cut in your perineum. Then the doctor will collect samples from the prostate through the cut with a special tool. · An ultrasound probe inserted into the rectum may be used to help find the locations in the prostate where samples need to be taken. Sometimes other imaging, such as MRI, is used instead of or along with ultrasound. Or the test results from other imaging, such as an MRI or a CT scan, may be used to guide the sampling. How long will the test take? This test will take from 15 to 45 minutes, depending on how it's done. What happens after the test?  You will probably be able to go home right away, and you may feel tired the rest of the day. Your muscles may ache. Don't do heavy work or exercise for 4 hours after the test.  You may have mild pain in your pelvic area and blood in your urine for up to 5 days. You may have some blood in your semen for a week or longer. You may also have a change in color of your semen for up to 1 month after the biopsy. If the prostate samples are taken through the rectal wall (transrectal biopsy), you may have a small amount of bleeding from your rectum for 2 to 3 days after the biopsy. Follow-up care is a key part of your treatment and safety. Be sure to make and go to all appointments, and call your doctor if you are having problems. It's also a good idea to keep a list of the medicines you take. Ask your doctor when you can expect to have your test results. Where can you learn more?   Go to http://www.gray.com/  Enter Y504 in the search box to learn more about \"Prostate Biopsy: About This Test.\"  Current as of: December 17, 2020               Content Version: 13.0  © 2006-2021 [employer]. Care instructions adapted under license by [employer] (which disclaims liability or warranty for this information). If you have questions about a medical condition or this instruction, always ask your healthcare professional. [address] any warranty or liability for your use of this information. CALL YOUR DOCTOR IF     Call your doctor if pain is NOT relieved by medication.   Excessive bleeding that does not stop after holding pressure over the area  · Temperature of 101 degrees F or above  · Excessive redness, swelling or bruising, and/ or green or yellow, smelly discharge from incision      TYPICAL SIDE EFFECTS OF PAIN MEDICATION:     Constipation: Drink lots of fluids. Over the counter stool softener if needed.    Nausea: Take pain medication with food. Call your doctor with persistent nausea. ACTIVITY  · As tolerated and as directed by your doctor. · Bathe or shower as directed by your doctor. DIET  · Day of surgery: Clear liquids until no nausea or vomiting; small portion, light diet Roberts foods (ex: baked chicken, plain rice, grits, scrambled eggs, toast). Nothing greasy, fried or spicy today. · Advance to regular diet on second day, unless your doctor orders otherwise. · If nausea and vomiting continues, call your doctor. PAIN  · Take pain medication as directed by your doctor. · DO NOT take aspirin or blood thinners unless directed by your doctor. AFTER ANESTHESIA   · For the first 24 hours: DO NOT Drive, Drink alcoholic beverages, or Make important decisions. · Be aware of dizziness following anesthesia and while taking pain medication. CONTRACEPTIVES  During your procedure you potentially received medication(s) which may reduce the effectiveness of contraceptives.  Please consider other forms of contraception for 1 month following your procedure if you are currently using contraceptives as your primary form of birth control. In addition to this, it is recommended you continue your contraceptive as prescribed, unless otherwise instructed by your physician. PATIENT INSTRUCTIONS:    After general anesthesia or intravenous sedation, for 24 hours or while taking prescription Narcotics:  · Limit your activities  · Do not drive and operate hazardous machinery  · Do not make important personal or business decisions  · Do  not drink alcoholic beverages  · If you have not urinated within 8 hours after discharge, please contact your surgeon on call. *  Please give a list of your current medications to your Primary Care Provider. *  Please update this list whenever your medications are discontinued, doses are      changed, or new medications (including over-the-counter products) are added. *  Please carry medication information at all times in case of emergency situations. Preventing Infection at Home  We care about preventing infection and avoiding the spread of germs - not only when you are in the hospital but also when you return home. When you return home from the hospital, its important to take the following steps to help prevent infection and avoid spreading germs that could infect you and others. Ask everyone in your home to follow these guidelines, too. Clean Your Hands  · Clean your hands whenever your hands are visibly dirty, before you eat, before or after touching your mouth, nose or eyes, and before preparing food. Clean them after contact with body fluids, using the restroom, touching animals or changing diapers. · When washing hands, wet them with warm water and work up a lather. Rub hands for at least 15 seconds, then rinse them and pat them dry with a clean towel or paper towel.   · When using hand sanitizers, it should take about 15 seconds to rub your hands dry. If not, you probably didnt apply enough . Cover Your Sneeze or Cough  Germs are released into the air whenever you sneeze or cough. To prevent the spread of infection:  · Turn away from other people before coughing or sneezing. · Cover your mouth or nose with a tissue when you cough or sneeze. Put the tissue in the trash. · If you dont have a tissue, cough or sneeze into your upper sleeve, not your hands. · Always clean your hands after coughing or sneezing. Care for Wounds  Your skin is your bodys first line of defense against germs, but an open wound leaves an easy way for germs to enter your body. To prevent infection:  · Clean your hands before and after changing wound dressings, and wear gloves to change dressings if recommended by your doctor. · Take special care with IV lines or other devices inserted into the body. If you must touch them, clean your hands first.  · Follow any specific instructions from your doctor to care for your wounds. Contact your doctor if you experience any signs of infection, such as fever or increased redness at the surgical or wound site. Keep a Clean Home  · Clean or wipe commonly touched hard surfaces like door handles, sinks, tabletops, phones and TV remotes. · Use products labeled disinfectant to kill harmful bacteria and viruses. · Use a clean cloth or paper towel to clean and dry surfaces. Wiping surfaces with a dirty dishcloth, sponge or towel will only spread germs. · Never share toothbrushes, benton, drinking glasses, utensils, razor blades, face cloths or bath towels to avoid spreading germs. · Be sure that the linens that you sleep on are clean. · Keep pets away from wounds and wash your hands after touching pets, their toys or bedding. We care about you and your health. Remember, preventing infections is a team effort between you, your family, friends and health care providers.      These are general instructions for a healthy lifestyle:    No smoking/ No tobacco products/ Avoid exposure to second hand smoke  Surgeon General's Warning:  Quitting smoking now greatly reduces serious risk to your health. Obesity, smoking, and sedentary lifestyle greatly increases your risk for illness  A healthy diet, regular physical exercise & weight monitoring are important for maintaining a healthy lifestyle  You may be retaining fluid if you have a history of heart failure or if you experience any of the following symptoms:  Weight gain of 3 pounds or more overnight or 5 pounds in a week, increased swelling in our hands or feet or shortness of breath while lying flat in bed. Please call your doctor as soon as you notice any of these symptoms; do not wait until your next office visit. Recognize signs and symptoms of STROKE:  F-face looks uneven  A-arms unable to move or move unevenly  S-speech slurred or non-existent  T-time-call 911 as soon as signs and symptoms begin-DO NOT go       Back to bed or wait to see if you get better-TIME IS BRAIN. Learning About Coronavirus [phone])  Coronavirus [phone]): Overview  What is coronavirus (COVID-19)? The coronavirus disease (COVID-19) is caused by a virus. It is an illness that was first found in [place], [place], in December 2019. It has since spread worldwide. The virus can cause fever, cough, and trouble breathing. In severe cases, it can cause pneumonia and make it hard to breathe without help. It can cause death. Coronaviruses are a large group of viruses. They cause the common cold. They also cause more serious illnesses like Middle East respiratory syndrome (MERS) and severe acute respiratory syndrome (SARS). COVID-19 is caused by a novel coronavirus. That means it's a new type that has not been seen in people before. This virus spreads person-to-person through droplets from coughing and sneezing. It can also spread when you are close to someone who is infected.  And it can spread when you touch something that has the virus on it, such as a doorknob or a tabletop. What can you do to protect yourself from coronavirus (COVID-19)? The best way to protect yourself from getting sick is to:  · Avoid areas where there is an outbreak. · Avoid contact with people who may be infected. · Wash your hands often with soap or alcohol-based hand sanitizers. · Avoid crowds and try to stay at least 6 feet away from other people. · Wash your hands often, especially after you cough or sneeze. Use soap and water, and scrub for at least 20 seconds. If soap and water aren't available, use an alcohol-based hand . · Avoid touching your mouth, nose, and eyes. What can you do to avoid spreading the virus to others? To help avoid spreading the virus to others:  · Cover your mouth with a tissue when you cough or sneeze. Then throw the tissue in the trash. · Use a disinfectant to clean things that you touch often. · Wear a cloth face cover if you have to go to public areas. · Stay home if you are sick or have been exposed to the virus. Don't go to school, work, or public areas. And don't use public transportation, ride-shares, or taxis unless you have no choice. · If you are sick:  ? Leave your home only if you need to get medical care. But call the doctor's office first so they know you're coming. And wear a face cover. ? Wear the face cover whenever you're around other people. It can help stop the spread of the virus when you cough or sneeze. ? Clean and disinfect your home every day. Use household  and disinfectant wipes or sprays. Take special care to clean things that you grab with your hands. These include doorknobs, remote controls, phones, and handles on your refrigerator and microwave. And don't forget countertops, tabletops, bathrooms, and computer keyboards. When to call for help  [id number] anytime you think you may need emergency care.  For example, call if:  · You have severe trouble breathing. (You can't talk at all.)  · You have constant chest pain or pressure. · You are severely dizzy or lightheaded. · You are confused or can't think clearly. · Your face and lips have a blue color. · You pass out (lose consciousness) or are very hard to wake up. Call your doctor now if you develop symptoms such as:  · Shortness of breath. · Fever. · Cough. If you need to get care, call ahead to the doctor's office for instructions before you go. Make sure you wear a face cover to prevent exposing other people to the virus. Where can you get the latest information? The following health organizations are tracking and studying this virus. Their websites contain the most up-to-date information. Michael Terri also learn what to do if you think you may have been exposed to the virus. · U.S. Centers for Disease Control and Prevention (CDC): The CDC provides updated news about the disease and travel advice. The website also tells you how to prevent the spread of infection. www.cdc.gov  · World Health Organization [facility]): WHO offers information about the virus outbreaks. WHO also has travel advice. www.who.int  Current as of: May 8, 2020               Content Version: 12.5  © 2006-2020 [employer]. Care instructions adapted under license by [employer] (which disclaims liability or warranty for this information). If you have questions about a medical condition or this instruction, always ask your healthcare professional. [address] any warranty or liability for your use of this information.

## 2021-11-17 NOTE — ANESTHESIA POSTPROCEDURE EVALUATION
Procedure(s):  TRUSS PROSTATE BIOPSY. total IV anesthesia    Anesthesia Post Evaluation      Multimodal analgesia: multimodal analgesia used between 6 hours prior to anesthesia start to PACU discharge  Patient location during evaluation: bedside  Patient participation: complete - patient participated  Level of consciousness: awake  Pain management: adequate  Airway patency: patent  Anesthetic complications: no  Cardiovascular status: acceptable  Respiratory status: spontaneous ventilation and acceptable  Hydration status: acceptable  Post anesthesia nausea and vomiting:  none      INITIAL Post-op Vital signs:   Vitals Value Taken Time   /69 11/17/21 1520   Temp 36.4 °C (97.5 °F) 11/17/21 1520   Pulse 65 11/17/21 1524   Resp 16 11/17/21 1520   SpO2 95 % 11/17/21 1524   Vitals shown include unvalidated device data.

## 2021-11-18 NOTE — OP NOTES
[facility]  OPERATIVE REPORT    Name:  Dulce Maria Michelle  MR#:  [id number]  :  1945  ACCOUNT #:  [de-identified]  DATE OF SERVICE:  2021    PREOPERATIVE DIAGNOSIS:  Prostate cancer with biochemical recurrence after definitive radiation. POSTOPERATIVE DIAGNOSIS:  Prostate cancer with biochemical recurrence after definitive radiation. PROCEDURE PERFORMED:  Transrectal ultrasound biopsy of the prostate and both seminal vesicles. SURGEON:  Miguel Lawton MD    ASSISTANT: None    ANESTHESIA:  General    COMPLICATIONS:  None. NARRATIVE:  The patient was taken to the OR and after adequate general anesthesia was achieved, he was placed in dorsal lithotomy position. This patient had CyberKnife previously for prostate cancer, but has now had biochemical recurrence. Gonzales Bodo scan shows a hypermetabolic area in the right seminal vesicle as well as the right base of the prostate. Probe was inserted and the prostate was imaged in real time. The prostate parenchyma appeared homogeneous. Estimated prostate size is 14 ccm. Seminal vesicles appeared unremarkable. Six transrectal ultrasound biopsies were taken from the right seminal vesicle, 3 from the left seminal vesicle. I then performed 2 biopsies at either apex, 3 at either mid gland and 2 at either base. This gives us a total of 23 cores. Probe was removed. The patient was taken down out of dorsal lithotomy position, awakened, extubated and taken from the OR without any further incident or complaint.         Lauren Thrasher MD      [id number]  D:  2021 15:23  T:  2021 1:14  JOB #:  [id number]

## 2022-07-20 ENCOUNTER — TELEPHONE (OUTPATIENT)
Dept: UROLOGY | Age: 77
End: 2022-07-20

## 2022-07-20 NOTE — TELEPHONE ENCOUNTER
Pt called and is asking that you look or review his path report from [facility]. The pts results are under the care everywhere under the tab for Mendez .   Please call pt after you review he wants your insight on this //dh

## (undated) DEVICE — SET IRRIG DST FLX M CONN

## (undated) DEVICE — WEREWOLF FLOW 50 COBLATION WAND: Brand: COBLATION

## (undated) DEVICE — T-DRAPE,EXTREMITY,STERILE: Brand: MEDLINE

## (undated) DEVICE — SINGLE PORT MANIFOLD: Brand: NEPTUNE 2

## (undated) DEVICE — DRESSING,GAUZE,XEROFORM,CURAD,1"X8",ST: Brand: CURAD

## (undated) DEVICE — ZIMMER® STERILE DISPOSABLE TOURNIQUET CUFF WITH PLC, DUAL PORT, SINGLE BLADDER, 30 IN. (76 CM)

## (undated) DEVICE — INTENDED FOR TISSUE SEPARATION, AND OTHER PROCEDURES THAT REQUIRE A SHARP SURGICAL BLADE TO PUNCTURE OR CUT.: Brand: BARD-PARKER ® STAINLESS STEEL BLADES

## (undated) DEVICE — NEEDLE HYPO 18GA L1.5IN THN WALL PIVOTING SHLD BVL ORIENTED

## (undated) DEVICE — SET IRRIG L94IN ID0.281IN W/ 4.5IN DST FLX CONN 2 LD ON OFF

## (undated) DEVICE — SYR LR LCK 1ML GRAD NSAF 30ML --

## (undated) DEVICE — DRAPE TWL SURG 16X26IN BLU ORB04] ALLCARE INC]

## (undated) DEVICE — STERILE PACKED. BORE DIAMETER 1.6 MM; ANGLE OF INSERTION 19° TO THE LONG AXIS OF THE TRANSDUCER: Brand: SINGLE-USE BIPLANE BIOPSY GUIDE

## (undated) DEVICE — BLADE SHV CUT MENIS AGG + 4MM --

## (undated) DEVICE — STERILE HOOK LOCK LATEX FREE ELASTIC BANDAGE 6INX5YD: Brand: HOOK LOCK™

## (undated) DEVICE — KNEE ARTHRO LEE-ROBERSON: Brand: MEDLINE INDUSTRIES, INC.

## (undated) DEVICE — AMD ANTIMICROBIAL GAUZE SPONGES,12 PLY USP TYPE VII, 0.2% POLYHEXAMETHYLENE BIGUANIDE HCI (PHMB): Brand: CURITY

## (undated) DEVICE — KIT BX PROST 20ML VI PREFIL W 10ML 10% NEUT BUFF FRMLN LEAK

## (undated) DEVICE — STERILE POLYISOPRENE POWDER-FREE SURGICAL GLOVES: Brand: PROTEXIS

## (undated) DEVICE — PADDING CAST W4INXL4YD ST COT COHESIVE HND TEARABLE SPEC

## (undated) DEVICE — (D)PREP SKN CHLRAPRP APPL 26ML -- CONVERT TO ITEM 371833

## (undated) DEVICE — STOCKINETTE,IMPERVIOUS,12X48,STERILE: Brand: MEDLINE

## (undated) DEVICE — BANDAGE COBAN 6 IN WND 6INX5YD FOAM

## (undated) DEVICE — [AGGRESSIVE 6-FLUTE ROUND BUR, ARTHROSCOPIC SHAVER BLADE,  DO NOT RESTERILIZE,  DO NOT USE IF PACKAGE IS DAMAGED,  KEEP DRY,  KEEP AWAY FROM SUNLIGHT]: Brand: FORMULA

## (undated) DEVICE — MAX-CORE® DISPOSABLE CORE BIOPSY INSTRUMENT, 18G X 25CM: Brand: MAX-CORE

## (undated) DEVICE — SOLUTION IRRIG 3000ML 0.9% SOD CHL FLX CONT 0797208] ICU MEDICAL INC]